# Patient Record
Sex: FEMALE | Race: WHITE | Employment: FULL TIME | URBAN - METROPOLITAN AREA
[De-identification: names, ages, dates, MRNs, and addresses within clinical notes are randomized per-mention and may not be internally consistent; named-entity substitution may affect disease eponyms.]

---

## 2018-11-05 ENCOUNTER — HOSPITAL ENCOUNTER (EMERGENCY)
Age: 23
Discharge: HOME | End: 2018-11-05
Attending: EMERGENCY MEDICINE
Payer: COMMERCIAL

## 2018-11-05 VITALS
HEART RATE: 71 BPM | HEIGHT: 66 IN | OXYGEN SATURATION: 97 % | RESPIRATION RATE: 16 BRPM | DIASTOLIC BLOOD PRESSURE: 85 MMHG | BODY MASS INDEX: 20.73 KG/M2 | SYSTOLIC BLOOD PRESSURE: 125 MMHG | TEMPERATURE: 98 F | WEIGHT: 129 LBS

## 2018-11-05 DIAGNOSIS — G44.209 ACUTE NON INTRACTABLE TENSION-TYPE HEADACHE: Primary | ICD-10-CM

## 2018-11-05 DIAGNOSIS — R42 DIZZINESS: ICD-10-CM

## 2018-11-05 LAB
ALBUMIN SERPL-MCNC: 3.4 G/DL (ref 3.4–5)
ALBUMIN/GLOB SERPL: 1 {RATIO} (ref 0.8–1.7)
ALP SERPL-CCNC: 52 U/L (ref 45–117)
ALT SERPL-CCNC: 17 U/L (ref 13–56)
ANION GAP SERPL CALC-SCNC: 9 MMOL/L (ref 3–18)
APPEARANCE UR: CLEAR
AST SERPL-CCNC: 16 U/L (ref 15–37)
BACTERIA URNS QL MICRO: ABNORMAL /HPF
BASOPHILS # BLD: 0 K/UL (ref 0–0.1)
BASOPHILS NFR BLD: 0 % (ref 0–2)
BILIRUB SERPL-MCNC: 0.3 MG/DL (ref 0.2–1)
BILIRUB UR QL: NEGATIVE
BUN SERPL-MCNC: 12 MG/DL (ref 7–18)
BUN/CREAT SERPL: 17
CALCIUM SERPL-MCNC: 8.3 MG/DL (ref 8.5–10.1)
CHLORIDE SERPL-SCNC: 109 MMOL/L (ref 100–108)
CO2 SERPL-SCNC: 21 MMOL/L (ref 21–32)
COLOR UR: YELLOW
CREAT SERPL-MCNC: 0.7 MG/DL (ref 0.6–1.3)
DIFFERENTIAL METHOD BLD: ABNORMAL
EOSINOPHIL # BLD: 0.7 K/UL (ref 0–0.4)
EOSINOPHIL NFR BLD: 13 % (ref 0–5)
EPITH CASTS URNS QL MICRO: ABNORMAL /LPF (ref 0–5)
ERYTHROCYTE [DISTWIDTH] IN BLOOD BY AUTOMATED COUNT: 15.1 % (ref 11.6–14.5)
GLOBULIN SER CALC-MCNC: 3.3 G/DL (ref 2–4)
GLUCOSE SERPL-MCNC: 89 MG/DL (ref 74–99)
GLUCOSE UR STRIP.AUTO-MCNC: NEGATIVE MG/DL
HCG UR QL: NEGATIVE
HCT VFR BLD AUTO: 38.9 % (ref 35–45)
HGB BLD-MCNC: 13 G/DL (ref 12–16)
HGB UR QL STRIP: NEGATIVE
KETONES UR QL STRIP.AUTO: NEGATIVE MG/DL
LEUKOCYTE ESTERASE UR QL STRIP.AUTO: NEGATIVE
LYMPHOCYTES # BLD: 1.7 K/UL (ref 0.9–3.6)
LYMPHOCYTES NFR BLD: 34 % (ref 21–52)
MAGNESIUM SERPL-MCNC: 1.9 MG/DL (ref 1.6–2.6)
MCH RBC QN AUTO: 29.4 PG (ref 24–34)
MCHC RBC AUTO-ENTMCNC: 33.4 G/DL (ref 31–37)
MCV RBC AUTO: 88 FL (ref 74–97)
MONOCYTES # BLD: 0.3 K/UL (ref 0.05–1.2)
MONOCYTES NFR BLD: 7 % (ref 3–10)
NEUTS SEG # BLD: 2.3 K/UL (ref 1.8–8)
NEUTS SEG NFR BLD: 46 % (ref 40–73)
NITRITE UR QL STRIP.AUTO: NEGATIVE
PH UR STRIP: 5 [PH] (ref 5–8)
PLATELET # BLD AUTO: 238 K/UL (ref 135–420)
PMV BLD AUTO: 10.8 FL (ref 9.2–11.8)
POTASSIUM SERPL-SCNC: 4.1 MMOL/L (ref 3.5–5.5)
PROT SERPL-MCNC: 6.7 G/DL (ref 6.4–8.2)
PROT UR STRIP-MCNC: ABNORMAL MG/DL
RBC # BLD AUTO: 4.42 M/UL (ref 4.2–5.3)
RBC #/AREA URNS HPF: 0 /HPF (ref 0–5)
SODIUM SERPL-SCNC: 139 MMOL/L (ref 136–145)
SP GR UR REFRACTOMETRY: 1.02 (ref 1–1.03)
UROBILINOGEN UR QL STRIP.AUTO: 0.2 EU/DL (ref 0.2–1)
WBC # BLD AUTO: 5 K/UL (ref 4.6–13.2)
WBC URNS QL MICRO: ABNORMAL /HPF (ref 0–5)

## 2018-11-05 PROCEDURE — 99284 EMERGENCY DEPT VISIT MOD MDM: CPT

## 2018-11-05 PROCEDURE — 96375 TX/PRO/DX INJ NEW DRUG ADDON: CPT

## 2018-11-05 PROCEDURE — 81001 URINALYSIS AUTO W/SCOPE: CPT | Performed by: PHYSICIAN ASSISTANT

## 2018-11-05 PROCEDURE — 74011250636 HC RX REV CODE- 250/636: Performed by: PHYSICIAN ASSISTANT

## 2018-11-05 PROCEDURE — 81025 URINE PREGNANCY TEST: CPT

## 2018-11-05 PROCEDURE — 85025 COMPLETE CBC W/AUTO DIFF WBC: CPT | Performed by: PHYSICIAN ASSISTANT

## 2018-11-05 PROCEDURE — 83735 ASSAY OF MAGNESIUM: CPT | Performed by: PHYSICIAN ASSISTANT

## 2018-11-05 PROCEDURE — 96365 THER/PROPH/DIAG IV INF INIT: CPT

## 2018-11-05 PROCEDURE — 80053 COMPREHEN METABOLIC PANEL: CPT | Performed by: PHYSICIAN ASSISTANT

## 2018-11-05 RX ORDER — KETOROLAC TROMETHAMINE 30 MG/ML
30 INJECTION, SOLUTION INTRAMUSCULAR; INTRAVENOUS
Status: COMPLETED | OUTPATIENT
Start: 2018-11-05 | End: 2018-11-05

## 2018-11-05 RX ORDER — ONDANSETRON 4 MG/1
4 TABLET, ORALLY DISINTEGRATING ORAL
Qty: 12 TAB | Refills: 0 | Status: SHIPPED | OUTPATIENT
Start: 2018-11-05 | End: 2020-06-30

## 2018-11-05 RX ORDER — BUTALBITAL, ACETAMINOPHEN AND CAFFEINE 300; 40; 50 MG/1; MG/1; MG/1
1 CAPSULE ORAL
Qty: 10 CAP | Refills: 0 | Status: SHIPPED | OUTPATIENT
Start: 2018-11-05 | End: 2020-06-30

## 2018-11-05 RX ORDER — ACETAMINOPHEN 10 MG/ML
1000 INJECTION, SOLUTION INTRAVENOUS ONCE
Status: COMPLETED | OUTPATIENT
Start: 2018-11-05 | End: 2018-11-05

## 2018-11-05 RX ORDER — ONDANSETRON 2 MG/ML
4 INJECTION INTRAMUSCULAR; INTRAVENOUS
Status: COMPLETED | OUTPATIENT
Start: 2018-11-05 | End: 2018-11-05

## 2018-11-05 RX ORDER — DEXAMETHASONE SODIUM PHOSPHATE 4 MG/ML
10 INJECTION, SOLUTION INTRA-ARTICULAR; INTRALESIONAL; INTRAMUSCULAR; INTRAVENOUS; SOFT TISSUE
Status: COMPLETED | OUTPATIENT
Start: 2018-11-05 | End: 2018-11-05

## 2018-11-05 RX ADMIN — SODIUM CHLORIDE 1000 ML: 900 INJECTION, SOLUTION INTRAVENOUS at 10:46

## 2018-11-05 RX ADMIN — KETOROLAC TROMETHAMINE 30 MG: 30 INJECTION, SOLUTION INTRAMUSCULAR at 10:49

## 2018-11-05 RX ADMIN — DEXAMETHASONE SODIUM PHOSPHATE 10 MG: 4 INJECTION, SOLUTION INTRAMUSCULAR; INTRAVENOUS at 10:51

## 2018-11-05 RX ADMIN — ONDANSETRON 4 MG: 2 INJECTION INTRAMUSCULAR; INTRAVENOUS at 10:47

## 2018-11-05 RX ADMIN — ACETAMINOPHEN 1000 MG: 10 INJECTION, SOLUTION INTRAVENOUS at 10:54

## 2018-11-05 NOTE — ED TRIAGE NOTES
Pt reports severe headache since yesterday, has had headaches in past, this one worst because it starts at her neck and radiates up both sides of the back of her head;  Some nausea; Feels like she's \"zoning out\"  Pt reports she has seen her doctor for same and they said she had a virus; Pt also reports breaking out in rash around her mouth

## 2018-11-05 NOTE — DISCHARGE INSTRUCTIONS
Tension Headache: Care Instructions  Your Care Instructions  Most headaches are tension headaches. These headaches tend to happen again, especially if you are under stress. A tension headache may cause pain or a feeling of pressure all over your head. You probably can't pinpoint the center of the pain. If you keep getting tension headaches, the best thing you can do to limit them is to find out what is causing them and then make changes in those areas. Follow-up care is a key part of your treatment and safety. Be sure to make and go to all appointments, and call your doctor if you are having problems. It's also a good idea to know your test results and keep a list of the medicines you take. How can you care for yourself at home? · Rest in a quiet, dark room with a cool cloth on your forehead until your headache is gone. Close your eyes, and try to relax or go to sleep. Don't watch TV or read. Avoid using the computer. · Use a warm, moist towel or a heating pad set on low to relax tight shoulder and neck muscles. · Have someone gently massage your neck and shoulders. · Take pain medicines exactly as directed. ? If the doctor gave you a prescription medicine for pain, take it as prescribed. ? If you are not taking a prescription pain medicine, ask your doctor if you can take an over-the-counter medicine. · Be careful not to take pain medicine more often than the instructions allow, because you may get worse or more frequent headaches when the medicine wears off. · If you get another tension headache, stop what you are doing and sit quietly for a moment. Close your eyes and breathe slowly. Try to relax your head and neck muscles. · Do not ignore new symptoms that occur with a headache, such as fever, weakness or numbness, vision changes, or confusion. These may be signs of a more serious problem. To help prevent headaches  · Keep a headache diary so you can figure out what triggers your headaches. Avoiding triggers may help you prevent headaches. Record when each headache began, how long it lasted, and what the pain was like (throbbing, aching, stabbing, or dull). List anything that may have triggered the headache, such as being physically or emotionally stressed or being anxious or depressed. Other possible triggers are hunger, anger, fatigue, poor posture, and muscle strain. · Find healthy ways to deal with stress. Headaches are most common during or right after stressful times. Take time to relax before and after you do something that has caused a headache in the past.  · Exercise daily to relieve stress. Relaxation exercises may help reduce tension. · Get plenty of sleep. · Eat regularly and well. Long periods without food can trigger a headache. · Treat yourself to a massage. Some people find that massages are very helpful in relieving tension. · Try to keep your muscles relaxed by keeping good posture. Check your jaw, face, neck, and shoulder muscles for tension, and try to relax them. When sitting at a desk, change positions often, and stretch for 30 seconds each hour. · Reduce eyestrain from computers by blinking frequently and looking away from the computer screen every so often. Make sure you have proper eyewear and that your monitor is set up properly, about an arm's length away. When should you call for help? Call 911 anytime you think you may need emergency care. For example, call if:    · You have signs of a stroke. These may include:  ? Sudden numbness, paralysis, or weakness in your face, arm, or leg, especially on only one side of your body. ? Sudden vision changes. ? Sudden trouble speaking. ? Sudden confusion or trouble understanding simple statements. ? Sudden problems with walking or balance.   ? A sudden, severe headache that is different from past headaches.    Call your doctor now or seek immediate medical care if:    · You have new or worse nausea and vomiting.     · You have a new or higher fever.     · Your headache gets much worse.    Watch closely for changes in your health, and be sure to contact your doctor if:    · You are not getting better after 2 days (48 hours). Where can you learn more? Go to http://shelly-ben.info/. Enter 84 17 28 in the search box to learn more about \"Tension Headache: Care Instructions. \"  Current as of: June 4, 2018  Content Version: 11.8  © 6643-8647 [employer]. Care instructions adapted under license by [employer] (which disclaims liability or warranty for this information). If you have questions about a medical condition or this instruction, always ask your healthcare professional. [address] any warranty or liability for your use of this information.

## 2018-11-05 NOTE — LETTER
[facility] [place] 
[facility] 
[place] [id number] 
[phone] School Note Date: 11/5/2018 To Whom It May concern: 
 
Chente Andrade was seen and treated today in the emergency room by the following provider(s): 
Attending Provider: Domitila Sims MD 
Physician Assistant: Gilmer Sanchez PA-C. Chente Andrade may return to school on 11/6/18. Sincerely, Eric Conway PA-C

## 2018-11-05 NOTE — ED PROVIDER NOTES
EMERGENCY DEPARTMENT HISTORY AND PHYSICAL EXAM 
 
Date: 11/5/2018 Patient Name: More Garcia History of Presenting Illness Chief Complaint Patient presents with  
 Headache History Provided By: Patient Chief Complaint: HA 
Duration: 2-3 Weeks Timing:  Acute Location: Radiating from neck up head Quality: Aching Severity: 8 out of 10 Modifying Factors: Pain is exacerbated with movement, sound and when taking. Some alleviation wth 3 Excedrin. Associated Symptoms: neck pain described as discomfort, light-headedness, nausea, photophobia, fatigue, rash surrounding her mouth, subjective fever and intermittent sore throat Additional History (Context):  
8:52 AM  
More Garcia is a 26 y.o. female with PMHx of celiac disease and eczema who presents to the emergency department C/O aching HA (8/10) radiating from her neck up her head onset 2-3 weeks. Associated sxs include neck pain described as discomfort, light-headedness, nausea, photophobia, fatigue, rash surrounding her mouth, subjective fever and intermittent sore throat. Pain is exacerbated with movement, sound and when taking. Some alleviation with 3 Excedrin. Pt reports Hx of headaches. Pt reports she has seen her PCP for same and was told she had a virus. Pt states her mouth rash was dx'ed by her PCP as HSV but was told by a dermatologist it was not HSV due to the presentation. Pt admits to working two jobs while attending school but states she does sleep well. Pt is currently on her menses. Pt states she works in a [employer] and is concerned about her celiac disease. NKDA. Pt denies vomiting, chills, and any other sxs or complaints. PCP: Destiney Gaspar MD 
 
Current Outpatient Medications Medication Sig Dispense Refill  butalbital-acetaminophen-caff (FIORICET) -40 mg per capsule Take 1 Cap by mouth every four (4) hours as needed for Pain.  10 Cap 0  
  ondansetron (ZOFRAN ODT) 4 mg disintegrating tablet Take 1 Tab by mouth every eight (8) hours as needed for Nausea (or vomiting.). Allow to dissolve on tongue 12 Tab 0  
 norgestimate-ethinyl estradiol (MONO-LINYAH) 0.25-35 mg-mcg per tablet Take 1 Tab by mouth daily. Past History Past Medical History: 
Past Medical History:  
Diagnosis Date  Celiac disease Past Surgical History: 
Past Surgical History:  
Procedure Laterality Date  HX WISDOM TEETH EXTRACTION Family History: 
History reviewed. No pertinent family history. Social History: 
Social History Tobacco Use  Smoking status: Current Every Day Smoker Substance Use Topics  Alcohol use: Yes  Drug use: Not on file Allergies: Allergies Allergen Reactions  Gluten Other (comments) GI distress Review of Systems Review of Systems Constitutional: Positive for fatigue and fever (subjective). Negative for chills. HENT: Positive for sore throat (intermittent). Negative for congestion. Eyes: Positive for photophobia. Respiratory: Negative for cough and shortness of breath. Cardiovascular: Negative for chest pain. Gastrointestinal: Positive for nausea. Negative for abdominal distention and vomiting. Genitourinary: Positive for vaginal discharge. Negative for dysuria, frequency and urgency. Vaginal bleeding: on menses. Musculoskeletal: Positive for neck pain. Negative for back pain. Skin: Positive for rash (surrounding the mouth). Neurological: Positive for light-headedness and headaches. Negative for dizziness. Psychiatric/Behavioral:  
     (+) \"Zoning out\" All other systems reviewed and are negative. Physical Exam  
 
Vitals:  
 11/05/18 0803 11/05/18 1057 BP: 126/72 125/85 Pulse: 88 71 Resp: 16 16 Temp: 98 °F (36.7 °C) SpO2: 100% 97% Weight: 58.5 kg (129 lb) Height: 5' 6\" (1.676 m) Physical Exam  
 Constitutional: She is oriented to person, place, and time. She appears well-developed and well-nourished. No distress.  female in NAD. Alert. Social smile. Appears comfortable. Mother at bedside. HENT:  
Head: Normocephalic and atraumatic. Right Ear: External ear normal. No swelling or tenderness. Tympanic membrane is not perforated, not erythematous and not bulging. No hemotympanum. Left Ear: External ear normal. No swelling or tenderness. Tympanic membrane is not perforated, not erythematous and not bulging. No hemotympanum. Nose: Nose normal. No mucosal edema or rhinorrhea. Right sinus exhibits no maxillary sinus tenderness and no frontal sinus tenderness. Left sinus exhibits no maxillary sinus tenderness and no frontal sinus tenderness. Mouth/Throat: Uvula is midline, oropharynx is clear and moist and mucous membranes are normal. No oral lesions. No trismus in the jaw. No dental abscesses or uvula swelling. No oropharyngeal exudate, posterior oropharyngeal edema, posterior oropharyngeal erythema or tonsillar abscesses. Eyes: Conjunctivae and EOM are normal. Pupils are equal, round, and reactive to light. Right eye exhibits no discharge. Left eye exhibits no discharge. No scleral icterus. Neck: Normal range of motion. Neck supple. Cardiovascular: Normal rate, regular rhythm, normal heart sounds and intact distal pulses. Exam reveals no gallop and no friction rub. No murmur heard. Pulmonary/Chest: Effort normal and breath sounds normal. No accessory muscle usage. No tachypnea. No respiratory distress. She has no decreased breath sounds. She has no wheezes. She has no rhonchi. She has no rales. Musculoskeletal: Normal range of motion. She exhibits no edema or tenderness. Lymphadenopathy:  
  She has no cervical adenopathy. Neurological: She is alert and oriented to person, place, and time. She has normal strength. No cranial nerve deficit or sensory deficit. Coordination and gait normal. GCS eye subscore is 4. GCS verbal subscore is 5. GCS motor subscore is 6. Normal finger to nose Neg pronator Skin: Skin is warm and dry. No rash noted. She is not diaphoretic. No erythema. Psychiatric: She has a normal mood and affect. Judgment normal.  
Nursing note and vitals reviewed. Diagnostic Study Results Labs - Recent Results (from the past 12 hour(s)) CBC WITH AUTOMATED DIFF Collection Time: 11/05/18 10:15 AM  
Result Value Ref Range WBC 5.0 4.6 - 13.2 K/uL  
 RBC 4.42 4.20 - 5.30 M/uL  
 HGB 13.0 12.0 - 16.0 g/dL HCT 38.9 35.0 - 45.0 % MCV 88.0 74.0 - 97.0 FL  
 MCH 29.4 24.0 - 34.0 PG  
 MCHC 33.4 31.0 - 37.0 g/dL  
 RDW 15.1 (H) 11.6 - 14.5 % PLATELET 100 326 - 708 K/uL MPV 10.8 9.2 - 11.8 FL  
 NEUTROPHILS 46 40 - 73 % LYMPHOCYTES 34 21 - 52 % MONOCYTES 7 3 - 10 % EOSINOPHILS 13 (H) 0 - 5 % BASOPHILS 0 0 - 2 %  
 ABS. NEUTROPHILS 2.3 1.8 - 8.0 K/UL  
 ABS. LYMPHOCYTES 1.7 0.9 - 3.6 K/UL  
 ABS. MONOCYTES 0.3 0.05 - 1.2 K/UL  
 ABS. EOSINOPHILS 0.7 (H) 0.0 - 0.4 K/UL  
 ABS. BASOPHILS 0.0 0.0 - 0.1 K/UL  
 DF AUTOMATED METABOLIC PANEL, COMPREHENSIVE Collection Time: 11/05/18 10:15 AM  
Result Value Ref Range Sodium 139 136 - 145 mmol/L Potassium 4.1 3.5 - 5.5 mmol/L Chloride 109 (H) 100 - 108 mmol/L  
 CO2 21 21 - 32 mmol/L Anion gap 9 3.0 - 18 mmol/L Glucose 89 74 - 99 mg/dL BUN 12 7.0 - 18 MG/DL Creatinine 0.70 0.6 - 1.3 MG/DL  
 BUN/Creatinine ratio 17 GFR est AA >60 >60 ml/min/1.73m2 GFR est non-AA >60 >60 ml/min/1.73m2 Calcium 8.3 (L) 8.5 - 10.1 MG/DL Bilirubin, total 0.3 0.2 - 1.0 MG/DL  
 ALT (SGPT) 17 13 - 56 U/L  
 AST (SGOT) 16 15 - 37 U/L Alk. phosphatase 52 45 - 117 U/L Protein, total 6.7 6.4 - 8.2 g/dL Albumin 3.4 3.4 - 5.0 g/dL Globulin 3.3 2.0 - 4.0 g/dL A-G Ratio 1.0 0.8 - 1.7 MAGNESIUM  Collection Time: 11/05/18 10:15 AM  
 Result Value Ref Range Magnesium 1.9 1.6 - 2.6 mg/dL URINALYSIS W/ RFLX MICROSCOPIC Collection Time: 11/05/18 10:30 AM  
Result Value Ref Range Color YELLOW Appearance CLEAR Specific gravity 1.022 1.005 - 1.030    
 pH (UA) 5.0 5.0 - 8.0 Protein TRACE (A) NEG mg/dL Glucose NEGATIVE  NEG mg/dL Ketone NEGATIVE  NEG mg/dL Bilirubin NEGATIVE  NEG Blood NEGATIVE  NEG Urobilinogen 0.2 0.2 - 1.0 EU/dL Nitrites NEGATIVE  NEG Leukocyte Esterase NEGATIVE  NEG    
HCG URINE, QL. - POC Collection Time: 11/05/18 10:30 AM  
Result Value Ref Range Pregnancy test,urine (POC) NEGATIVE  NEG    
URINE MICROSCOPIC ONLY Collection Time: 11/05/18 10:30 AM  
Result Value Ref Range WBC 0 to 2 0 - 5 /hpf  
 RBC 0 0 - 5 /hpf Epithelial cells 1+ 0 - 5 /lpf Bacteria FEW (A) NEG /hpf Radiologic Studies - No orders to display CT Results  (Last 48 hours) None CXR Results  (Last 48 hours) None Medications given in the ED- Medications  
dexamethasone (DECADRON) 4 mg/mL injection 10 mg (10 mg IntraVENous Given 11/5/18 1051)  
ketorolac (TORADOL) injection 30 mg (30 mg IntraVENous Given 11/5/18 1049) ondansetron [facility]) injection 4 mg (4 mg IntraVENous Given 11/5/18 1047)  
sodium chloride 0.9 % bolus infusion 1,000 mL (1,000 mL IntraVENous New Bag 11/5/18 1046)  
acetaminophen (OFIRMEV) infusion 1,000 mg (0 mg IntraVENous IV Completed 11/5/18 1128) Medical Decision Making I am the first provider for this patient. I reviewed the vital signs, available nursing notes, past medical history, past surgical history, family history and social history. Vital Signs-Reviewed the patient's vital signs. Pulse Oximetry Analysis - 100% on RA Records Reviewed: Nursing Notes and Old Medical Records Provider Notes (Medical Decision Making): tension, cluster, sinusitis, migraine, pseudotumor, TA, dehydration, metabolic derangement, new onse DMII. Not c/w SAH. Doubt meningitis Procedures: 
Procedures ED Course:  
8:52 AM Initial assessment performed. The patients presenting problems have been discussed, and they are in agreement with the care plan formulated and outlined with them. I have encouraged them to ask questions as they arise throughout their visit. 11:47 AM Pt feels better. Headache is down to a 2/10. Nausea has resolved. Neck supple. No indication for emergent CT or LP. Close PCP FU. Recommended outpatient lyme titer. Suspect HA tension related. No FND on serial exams. Reasons to RTED discussed with pt. All questions answered. Pt feels comfortable going home at this time. Pt expressed understanding and she agrees with plan. Diagnosis and Disposition DISCHARGE NOTE: 
11:48 AM  
Will Malin's  results have been reviewed with her. She has been counseled regarding her diagnosis, treatment, and plan. She verbally conveys understanding and agreement of the signs, symptoms, diagnosis, treatment and prognosis and additionally agrees to follow up as discussed. She also agrees with the care-plan and conveys that all of her questions have been answered. I have also provided discharge instructions for her that include: educational information regarding their diagnosis and treatment, and list of reasons why they would want to return to the ED prior to their follow-up appointment, should her condition change. She has been provided with education for proper emergency department utilization. CLINICAL IMPRESSION: 
 
1. Acute non intractable tension-type headache 2. Dizziness PLAN: 
1. D/C Home 2. Current Discharge Medication List  
  
START taking these medications Details  
butalbital-acetaminophen-caff (FIORICET) -40 mg per capsule Take 1 Cap by mouth every four (4) hours as needed for Pain. Qty: 10 Cap, Refills: 0 Associated Diagnoses: Acute non intractable tension-type headache  
  
ondansetron (ZOFRAN ODT) 4 mg disintegrating tablet Take 1 Tab by mouth every eight (8) hours as needed for Nausea (or vomiting.). Allow to dissolve on tongue Qty: 12 Tab, Refills: 0  
  
  
 
3. Follow-up Information Follow up With Specialties Details Why Contact Info Rosanna Allen MD Family Practice Schedule an appointment as soon as possible for a visit in 3 days For primary care follow up. [address] [facility] [employer] [address] [place]aredes [id number] [phone] [facility] EMERGENCY DEPT Emergency Medicine Go to As needed, If symptoms worsen [address] [facility] [address] [place] [id number] 
[phone]  
  
 
_______________________________ Attestations: This note is prepared by Stacy Swanson, acting as Scribe for [employer]. [employer]:  The scribe's documentation has been prepared under my direction and personally reviewed by me in its entirety. I confirm that the note above accurately reflects all work, treatment, procedures, and medical decision making performed by me. 
_______________________________

## 2019-07-12 ENCOUNTER — HOSPITAL ENCOUNTER (EMERGENCY)
Age: 24
Discharge: HOME | End: 2019-07-13
Attending: EMERGENCY MEDICINE | Admitting: EMERGENCY MEDICINE
Payer: COMMERCIAL

## 2019-07-12 DIAGNOSIS — B37.31 YEAST VAGINITIS: ICD-10-CM

## 2019-07-12 DIAGNOSIS — O20.0 THREATENED MISCARRIAGE IN EARLY PREGNANCY: Primary | ICD-10-CM

## 2019-07-12 DIAGNOSIS — N76.0 BV (BACTERIAL VAGINOSIS): ICD-10-CM

## 2019-07-12 DIAGNOSIS — B96.89 BV (BACTERIAL VAGINOSIS): ICD-10-CM

## 2019-07-12 LAB
ALBUMIN SERPL-MCNC: 4.3 G/DL (ref 3.4–5)
ALBUMIN/GLOB SERPL: 1.2 {RATIO} (ref 0.8–1.7)
ALP SERPL-CCNC: 65 U/L (ref 45–117)
ALT SERPL-CCNC: 25 U/L (ref 13–56)
ANION GAP SERPL CALC-SCNC: 8 MMOL/L (ref 3–18)
APPEARANCE UR: CLEAR
AST SERPL-CCNC: 16 U/L (ref 15–37)
BASOPHILS # BLD: 0 K/UL (ref 0–0.1)
BASOPHILS NFR BLD: 0 % (ref 0–2)
BILIRUB SERPL-MCNC: 0.3 MG/DL (ref 0.2–1)
BILIRUB UR QL: NEGATIVE
BUN SERPL-MCNC: 16 MG/DL (ref 7–18)
BUN/CREAT SERPL: 20 (ref 12–20)
CALCIUM SERPL-MCNC: 9.2 MG/DL (ref 8.5–10.1)
CHLORIDE SERPL-SCNC: 106 MMOL/L (ref 100–108)
CO2 SERPL-SCNC: 26 MMOL/L (ref 21–32)
COLOR UR: YELLOW
CREAT SERPL-MCNC: 0.82 MG/DL (ref 0.6–1.3)
DIFFERENTIAL METHOD BLD: NORMAL
EOSINOPHIL # BLD: 0.2 K/UL (ref 0–0.4)
EOSINOPHIL NFR BLD: 3 % (ref 0–5)
ERYTHROCYTE [DISTWIDTH] IN BLOOD BY AUTOMATED COUNT: 12.6 % (ref 11.6–14.5)
GLOBULIN SER CALC-MCNC: 3.6 G/DL (ref 2–4)
GLUCOSE SERPL-MCNC: 87 MG/DL (ref 74–99)
GLUCOSE UR STRIP.AUTO-MCNC: NEGATIVE MG/DL
HCG SERPL-ACNC: 920 MIU/ML (ref 0–10)
HCG UR QL: NEGATIVE
HCT VFR BLD AUTO: 42.3 % (ref 35–45)
HGB BLD-MCNC: 14.4 G/DL (ref 12–16)
HGB UR QL STRIP: NEGATIVE
KETONES UR QL STRIP.AUTO: NEGATIVE MG/DL
LEUKOCYTE ESTERASE UR QL STRIP.AUTO: NEGATIVE
LYMPHOCYTES # BLD: 2.8 K/UL (ref 0.9–3.6)
LYMPHOCYTES NFR BLD: 37 % (ref 21–52)
MCH RBC QN AUTO: 31.4 PG (ref 24–34)
MCHC RBC AUTO-ENTMCNC: 34 G/DL (ref 31–37)
MCV RBC AUTO: 92.4 FL (ref 74–97)
MONOCYTES # BLD: 0.6 K/UL (ref 0.05–1.2)
MONOCYTES NFR BLD: 8 % (ref 3–10)
NEUTS SEG # BLD: 4 K/UL (ref 1.8–8)
NEUTS SEG NFR BLD: 52 % (ref 40–73)
NITRITE UR QL STRIP.AUTO: NEGATIVE
PH UR STRIP: 5.5 [PH] (ref 5–8)
PLATELET # BLD AUTO: 295 K/UL (ref 135–420)
PMV BLD AUTO: 10.4 FL (ref 9.2–11.8)
POTASSIUM SERPL-SCNC: 3.4 MMOL/L (ref 3.5–5.5)
PROT SERPL-MCNC: 7.9 G/DL (ref 6.4–8.2)
PROT UR STRIP-MCNC: NEGATIVE MG/DL
RBC # BLD AUTO: 4.58 M/UL (ref 4.2–5.3)
SODIUM SERPL-SCNC: 140 MMOL/L (ref 136–145)
SP GR UR REFRACTOMETRY: 1.01 (ref 1–1.03)
UROBILINOGEN UR QL STRIP.AUTO: 0.2 EU/DL (ref 0.2–1)
WBC # BLD AUTO: 7.7 K/UL (ref 4.6–13.2)

## 2019-07-12 PROCEDURE — 87210 SMEAR WET MOUNT SALINE/INK: CPT

## 2019-07-12 PROCEDURE — 87491 CHLMYD TRACH DNA AMP PROBE: CPT

## 2019-07-12 PROCEDURE — 80053 COMPREHEN METABOLIC PANEL: CPT

## 2019-07-12 PROCEDURE — 85025 COMPLETE CBC W/AUTO DIFF WBC: CPT

## 2019-07-12 PROCEDURE — 81025 URINE PREGNANCY TEST: CPT

## 2019-07-12 PROCEDURE — 99284 EMERGENCY DEPT VISIT MOD MDM: CPT

## 2019-07-12 PROCEDURE — 84702 CHORIONIC GONADOTROPIN TEST: CPT

## 2019-07-12 PROCEDURE — 81003 URINALYSIS AUTO W/O SCOPE: CPT

## 2019-07-13 VITALS
TEMPERATURE: 98.4 F | OXYGEN SATURATION: 100 % | BODY MASS INDEX: 21.66 KG/M2 | DIASTOLIC BLOOD PRESSURE: 72 MMHG | WEIGHT: 130 LBS | RESPIRATION RATE: 18 BRPM | SYSTOLIC BLOOD PRESSURE: 107 MMHG | HEIGHT: 65 IN | HEART RATE: 88 BPM

## 2019-07-13 LAB
ABO + RH BLD: NORMAL
BLOOD GROUP ANTIBODIES SERPL: NORMAL
SERVICE CMNT-IMP: NORMAL
SPECIMEN EXP DATE BLD: NORMAL
WET PREP GENITAL: NORMAL

## 2019-07-13 PROCEDURE — 86900 BLOOD TYPING SEROLOGIC ABO: CPT

## 2019-07-13 RX ORDER — NYSTATIN 100000 U/G
CREAM TOPICAL 2 TIMES DAILY
Qty: 15 G | Refills: 0 | Status: SHIPPED | OUTPATIENT
Start: 2019-07-13 | End: 2020-06-30

## 2019-07-13 RX ORDER — METRONIDAZOLE 500 MG/1
500 TABLET ORAL 2 TIMES DAILY
Qty: 14 TAB | Refills: 0 | Status: SHIPPED | OUTPATIENT
Start: 2019-07-13 | End: 2019-07-20

## 2019-07-13 NOTE — ED PROVIDER NOTES
EMERGENCY DEPARTMENT HISTORY AND PHYSICAL EXAM    Date: 7/12/2019  Patient Name: Argenis Whitley    History of Presenting Illness     Chief Complaint   Patient presents with    Vaginal Bleeding         History Provided By: Patient    Argenis Whitley is a 22 y.o. female with PMHX of celiac's disease who presents to the emergency department C/O pelvic cramping. Associated sxs include vaginal bleeding. LMP 5/21/2019. She reports positive UPT at home approximately 10 days ago. Patient states today started with some pelvic cramping and vaginal bleeding. Patient has upcoming OB appointment scheduled with Dr. Fatmata Martel however has not been evaluated yet. This is patient's first pregnancy. Pt denies nausea vomiting diarrhea fever, and any other sxs or complaints. PCP: Ludivina Miranda MD    Current Outpatient Medications   Medication Sig Dispense Refill    metroNIDAZOLE (FLAGYL) 500 mg tablet Take 1 Tab by mouth two (2) times a day for 7 days. 14 Tab 0    nystatin (MYCOSTATIN) topical cream Apply  to affected area two (2) times a day. 15 g 0    butalbital-acetaminophen-caff (FIORICET) -40 mg per capsule Take 1 Cap by mouth every four (4) hours as needed for Pain. 10 Cap 0    ondansetron (ZOFRAN ODT) 4 mg disintegrating tablet Take 1 Tab by mouth every eight (8) hours as needed for Nausea (or vomiting.). Allow to dissolve on tongue 12 Tab 0    norgestimate-ethinyl estradiol (MONO-LINYAH) 0.25-35 mg-mcg per tablet Take 1 Tab by mouth daily. Past History     Past Medical History:  Past Medical History:   Diagnosis Date    Celiac disease        Past Surgical History:  Past Surgical History:   Procedure Laterality Date    HX WISDOM TEETH EXTRACTION         Family History:  History reviewed. No pertinent family history. Social History:  Social History     Tobacco Use    Smoking status: Never Smoker    Smokeless tobacco: Never Used   Substance Use Topics    Alcohol use:  Yes    Drug use: Never       Allergies: Allergies   Allergen Reactions    Gluten Other (comments)     GI distress         Review of Systems   Review of Systems   Constitutional: Negative for fever. Gastrointestinal: Negative for diarrhea, nausea and vomiting. Genitourinary: Positive for pelvic pain and vaginal bleeding. Negative for dysuria. All other systems reviewed and are negative. Physical Exam     Vitals:    07/12/19 2244   BP: 122/80   Pulse: 88   Resp: 18   Temp: 98.4 °F (36.9 °C)   SpO2: 100%   Weight: 59 kg (130 lb)   Height: 5' 5\" (1.651 m)     Physical Exam   Constitutional: She is oriented to person, place, and time. She appears well-developed and well-nourished. No distress. HENT:   Head: Normocephalic and atraumatic. Eyes: Pupils are equal, round, and reactive to light. Conjunctivae and EOM are normal.   Neck: Normal range of motion. Neck supple. Cardiovascular: Normal rate and regular rhythm. Pulmonary/Chest: Effort normal and breath sounds normal.   Abdominal: Soft. Bowel sounds are normal. She exhibits no distension. There is no tenderness. There is no rebound and no guarding. Genitourinary: Uterus is not tender. Cervix exhibits no motion tenderness. There is bleeding (Evidence of previous bleeding with old blood in vault no active bleeding noted) in the vagina. No tenderness in the vagina. Musculoskeletal: Normal range of motion. Neurological: She is alert and oriented to person, place, and time. Skin: Skin is warm and dry. Psychiatric: She has a normal mood and affect. Her behavior is normal.   Nursing note and vitals reviewed.       Diagnostic Study Results     Labs -     Recent Results (from the past 12 hour(s))   URINALYSIS W/ RFLX MICROSCOPIC    Collection Time: 07/12/19 10:40 PM   Result Value Ref Range    Color YELLOW      Appearance CLEAR      Specific gravity 1.008 1.005 - 1.030      pH (UA) 5.5 5.0 - 8.0      Protein NEGATIVE  NEG mg/dL    Glucose NEGATIVE  NEG mg/dL Ketone NEGATIVE  NEG mg/dL    Bilirubin NEGATIVE  NEG      Blood NEGATIVE  NEG      Urobilinogen 0.2 0.2 - 1.0 EU/dL    Nitrites NEGATIVE  NEG      Leukocyte Esterase NEGATIVE  NEG     HCG URINE, QL. - POC    Collection Time: 07/12/19 10:54 PM   Result Value Ref Range    Pregnancy test,urine (POC) NEGATIVE  NEG     CBC WITH AUTOMATED DIFF    Collection Time: 07/12/19 11:01 PM   Result Value Ref Range    WBC 7.7 4.6 - 13.2 K/uL    RBC 4.58 4.20 - 5.30 M/uL    HGB 14.4 12.0 - 16.0 g/dL    HCT 42.3 35.0 - 45.0 %    MCV 92.4 74.0 - 97.0 FL    MCH 31.4 24.0 - 34.0 PG    MCHC 34.0 31.0 - 37.0 g/dL    RDW 12.6 11.6 - 14.5 %    PLATELET 001 634 - 533 K/uL    MPV 10.4 9.2 - 11.8 FL    NEUTROPHILS 52 40 - 73 %    LYMPHOCYTES 37 21 - 52 %    MONOCYTES 8 3 - 10 %    EOSINOPHILS 3 0 - 5 %    BASOPHILS 0 0 - 2 %    ABS. NEUTROPHILS 4.0 1.8 - 8.0 K/UL    ABS. LYMPHOCYTES 2.8 0.9 - 3.6 K/UL    ABS. MONOCYTES 0.6 0.05 - 1.2 K/UL    ABS. EOSINOPHILS 0.2 0.0 - 0.4 K/UL    ABS. BASOPHILS 0.0 0.0 - 0.1 K/UL    DF AUTOMATED     METABOLIC PANEL, COMPREHENSIVE    Collection Time: 07/12/19 11:01 PM   Result Value Ref Range    Sodium 140 136 - 145 mmol/L    Potassium 3.4 (L) 3.5 - 5.5 mmol/L    Chloride 106 100 - 108 mmol/L    CO2 26 21 - 32 mmol/L    Anion gap 8 3.0 - 18 mmol/L    Glucose 87 74 - 99 mg/dL    BUN 16 7.0 - 18 MG/DL    Creatinine 0.82 0.6 - 1.3 MG/DL    BUN/Creatinine ratio 20 12 - 20      GFR est AA >60 >60 ml/min/1.73m2    GFR est non-AA >60 >60 ml/min/1.73m2    Calcium 9.2 8.5 - 10.1 MG/DL    Bilirubin, total 0.3 0.2 - 1.0 MG/DL    ALT (SGPT) 25 13 - 56 U/L    AST (SGOT) 16 15 - 37 U/L    Alk.  phosphatase 65 45 - 117 U/L    Protein, total 7.9 6.4 - 8.2 g/dL    Albumin 4.3 3.4 - 5.0 g/dL    Globulin 3.6 2.0 - 4.0 g/dL    A-G Ratio 1.2 0.8 - 1.7     BETA HCG, QT    Collection Time: 07/12/19 11:01 PM   Result Value Ref Range    Beta HCG,  (H) 0 - 10 MIU/ML   WET PREP    Collection Time: 07/12/19 11:59 PM   Result Value Ref Range    Special Requests: NO SPECIAL REQUESTS      Wet prep CLUE CELLS PRESENT      Wet prep YEAST  FEW        Wet prep NO TRICHOMONAS SEEN         Radiologic Studies -   No orders to display     CT Results  (Last 48 hours)    None        CXR Results  (Last 48 hours)    None          Medications given in the ED-  Medications - No data to display      Medical Decision Making   I am the first provider for this patient. I reviewed the vital signs, available nursing notes, past medical history, past surgical history, family history and social history. Vital Signs-Reviewed the patient's vital signs. Records Reviewed: Nursing Notes    Procedures:  Pelvic Exam  Date/Time: 7/13/2019 12:06 AM  Performed by: PA  Type of exam performed: bimanual and speculum. External genitalia appearance: normal.    Vaginal exam:  bleeding. Bleeding: mild  Cervical exam:  no cervical motion tenderness and os closed. Specimen(s) collected:  chlamydia and GC. Bimanual exam:  normal.    Patient tolerance: Patient tolerated the procedure well with no immediate complications          ED Course:   11:15 PM   Initial assessment performed. The patients presenting problems have been discussed, and they are in agreement with the care plan formulated and outlined with them. I have encouraged them to ask questions as they arise throughout their visit. Discussion: 22 y.o. female primigravida with positive UPT 10 days ago complaining of acute onset tonight pelvic pain and vaginal bleeding. Patient afebrile with appropriate vital signs no active bleeding on vaginal exam quant 990. BV and yeast noted on wet prep GC chlamydia cultures pending. Will plan for Flagyl, nystatin topical and return to this facility in 2 days for repeat quant. Strict return precautions discussed. Diagnosis and Disposition       DISCHARGE NOTE:  Felipe Malin's  results have been reviewed with her.   She has been counseled regarding her diagnosis, treatment, and plan. She verbally conveys understanding and agreement of the signs, symptoms, diagnosis, treatment and prognosis and additionally agrees to follow up as discussed. She also agrees with the care-plan and conveys that all of her questions have been answered. I have also provided discharge instructions for her that include: educational information regarding their diagnosis and treatment, and list of reasons why they would want to return to the ED prior to their follow-up appointment, should her condition change. She has been provided with education for proper emergency department utilization. CLINICAL IMPRESSION:    1. Threatened miscarriage in early pregnancy    2. BV (bacterial vaginosis)    3. Yeast vaginitis        PLAN:  1. D/C Home  2. Current Discharge Medication List      START taking these medications    Details   metroNIDAZOLE (FLAGYL) 500 mg tablet Take 1 Tab by mouth two (2) times a day for 7 days. Qty: 14 Tab, Refills: 0      nystatin (MYCOSTATIN) topical cream Apply  to affected area two (2) times a day. Qty: 15 g, Refills: 0           3. Follow-up Information     Follow up With Specialties Details Why [address]    [facility] EMERGENCY DEPT Emergency Medicine In 2 days for repeat for serum HCG [address] Tomasz Garcia  [phone]    Celestina Castillo MD Obstetrics & Gynecology Call  [address] [place] [phone]                Please note that this dictation was completed with Sku[employer], the computer voice recognition software. Quite often unanticipated grammatical, syntax, homophones, and other interpretive errors are inadvertently transcribed by the computer software. Please disregard these errors. Please excuse any errors that have escaped final proofreading.

## 2019-07-13 NOTE — ED TRIAGE NOTES
Patient reports ambulatory to ED with c/c of slight vaginal bleeding that began tonight with some sharp cramping associated with it. Patient recently found out she was pregnant and may be approx 6-7 weeks along at this time. Patient reports this is her first pregnancy.

## 2019-07-13 NOTE — DISCHARGE INSTRUCTIONS
Patient Education        Bacterial Vaginosis: Care Instructions  Your Care Instructions    Bacterial vaginosis is a type of vaginal infection. It is caused by excess growth of certain bacteria that are normally found in the vagina. Symptoms can include itching, swelling, pain when you urinate or have sex, and a gray or yellow discharge with a \"fishy\" odor. It is not considered an infection that is spread through sexual contact. Although symptoms can be annoying and uncomfortable, bacterial vaginosis does not usually cause other health problems. However, if you have it while you are pregnant, it can cause complications. While the infection may go away on its own, most doctors use antibiotics to treat it. You may have been prescribed pills or vaginal cream. With treatment, bacterial vaginosis usually clears up in 5 to 7 days. Follow-up care is a key part of your treatment and safety. Be sure to make and go to all appointments, and call your doctor if you are having problems. It's also a good idea to know your test results and keep a list of the medicines you take. How can you care for yourself at home? · Take your antibiotics as directed. Do not stop taking them just because you feel better. You need to take the full course of antibiotics. · Do not eat or drink anything that contains alcohol if you are taking metronidazole (Flagyl). · Keep using your medicine if you start your period. Use pads instead of tampons while using a vaginal cream or suppository. Tampons can absorb the medicine. · Wear loose cotton clothing. Do not wear nylon and other materials that hold body heat and moisture close to the skin. · Do not scratch. Relieve itching with a cold pack or a cool bath. · Do not wash your vaginal area more than once a day. Use plain water or a mild, unscented soap. Do not douche. When should you call for help?   Watch closely for changes in your health, and be sure to contact your doctor if:    · You have unexpected vaginal bleeding.     · You have a fever.     · You have new or increased pain in your vagina or pelvis.     · You are not getting better after 1 week.     · Your symptoms return after you finish the course of your medicine. Where can you learn more? Go to http://shelly-ben.info/. Andrade Cindy in the search box to learn more about \"Bacterial Vaginosis: Care Instructions. \"  Current as of: May 14, 2018  Content Version: 11.9  © 6718-1427 [employer]. Care instructions adapted under license by [employer] (which disclaims liability or warranty for this information). If you have questions about a medical condition or this instruction, always ask your healthcare professional. [address] any warranty or liability for your use of this information. Patient Education        Vaginal Yeast Infection: Care Instructions  Your Care Instructions    A vaginal yeast infection is caused by too many yeast cells in the vagina. This is common in women of all ages. Itching, vaginal discharge and irritation, and other symptoms can bother you. But yeast infections don't often cause other health problems. Some medicines can increase your risk of getting a yeast infection. These include antibiotics, birth control pills, hormones, and steroids. You may also be more likely to get a yeast infection if you are pregnant, have diabetes, douche, or wear tight clothes. With treatment, most yeast infections get better in 2 to 3 days. Follow-up care is a key part of your treatment and safety. Be sure to make and go to all appointments, and call your doctor if you are having problems. It's also a good idea to know your test results and keep a list of the medicines you take. How can you care for yourself at home? · Take your medicines exactly as prescribed. Call your doctor if you think you are having a problem with your medicine.   · Ask your doctor about over-the-counter (OTC) medicines for yeast infections. They may cost less than prescription medicines. If you use an OTC treatment, read and follow all instructions on the label. · Do not use tampons while using a vaginal cream or suppository. The tampons can absorb the medicine. Use pads instead. · Wear loose cotton clothing. Do not wear nylon or other fabric that holds body heat and moisture close to the skin. · Try sleeping without underwear. · Do not scratch. Relieve itching with a cold pack or a cool bath. · Do not wash your vaginal area more than once a day. Use plain water or a mild, unscented soap. Air-dry the vaginal area. · Change out of wet swimsuits after swimming. · Do not have sex until you have finished your treatment. · Do not douche. When should you call for help? Call your doctor now or seek immediate medical care if:    · You have unexpected vaginal bleeding.     · You have new or increased pain in your vagina or pelvis.    Watch closely for changes in your health, and be sure to contact your doctor if:    · You have a fever.     · You are not getting better after 2 days.     · Your symptoms come back after you finish your medicines. Where can you learn more? Go to http://shelly-ben.info/. Enter S703 in the search box to learn more about \"Vaginal Yeast Infection: Care Instructions. \"  Current as of: May 14, 2018  Content Version: 11.9  © 0194-4462 [employer]. Care instructions adapted under license by [employer] (which disclaims liability or warranty for this information). If you have questions about a medical condition or this instruction, always ask your healthcare professional. [place] [address] any warranty or liability for your use of this information.          Patient Education        Threatened Miscarriage: Care Instructions  Your Care Instructions    Some women have light spotting or bleeding during the first 12 weeks of pregnancy. In some cases this is normal. Light spotting or bleeding can also be a sign of a possible loss of the pregnancy. This is called a threatened miscarriage. At this point, the doctor may not be able to tell if your vaginal bleeding is normal or is a sign of a miscarriage. In early pregnancy, things such as stress, exercise, and sex do not cause miscarriage. You may be worried or upset about the possibility of losing your pregnancy. But do not blame yourself. There is no treatment to stop a threatened miscarriage. If you do have a miscarriage, there was nothing you could have done to prevent it. A miscarriage usually means that the pregnancy is not developing normally. The doctor has checked you carefully, but problems can develop later. If you notice any problems or new symptoms, get medical treatment right away. Follow-up care is a key part of your treatment and safety. Be sure to make and go to all appointments, and call your doctor if you are having problems. It's also a good idea to know your test results and keep a list of the medicines you take. How can you care for yourself at home? · If you do have a miscarriage, you will probably have some vaginal bleeding for 1 to 2 weeks. Use pads instead of tampons. · Take acetaminophen (Tylenol) for cramps. Read and follow all instructions on the label. · Do not take two or more pain medicines at the same time unless the doctor told you to. Many pain medicines have acetaminophen, which is Tylenol. Too much acetaminophen (Tylenol) can be harmful. · Do not have sex until your doctor says it is okay. · Get lots of rest over the next several days. · You may do your normal activities if you feel well enough to do them. But do not do any heavy exercise until your doctor says it is okay. · Eat a balanced diet that is high in iron and vitamin C. Foods rich in iron include red meat, shellfish, eggs, beans, and leafy green vegetables.  Foods high in vitamin C include citrus fruits, tomatoes, and broccoli. Talk to your doctor about whether you need to take iron pills or a multivitamin. · Do not drink alcohol or use tobacco or illegal drugs. · Do not smoke. If you need help quitting, talk to your doctor about stop-smoking programs and medicines. These can increase your chances of quitting for good. When should you call for help? Call 911 anytime you think you may need emergency care. For example, call if:    · You passed out (lost consciousness).    Call your doctor now or seek immediate medical care if:    · You have severe vaginal bleeding.     · You are dizzy or lightheaded, or you feel like you may faint.     · You have new or worse pain in your belly or pelvis.     · You have a fever.     · You have vaginal discharge that smells bad.    Watch closely for changes in your health, and be sure to contact your doctor if:    · You do not get better as expected. Where can you learn more? Go to http://shelly-ben.info/. Enter B576 in the search box to learn more about \"Threatened Miscarriage: Care Instructions. \"  Current as of: September 5, 2018  Content Version: 11.9  © 4438-6089 [employer]. Care instructions adapted under license by [employer] (which disclaims liability or warranty for this information). If you have questions about a medical condition or this instruction, always ask your healthcare professional. [address] any warranty or liability for your use of this information.

## 2019-07-14 ENCOUNTER — HOSPITAL ENCOUNTER (EMERGENCY)
Age: 24
Discharge: HOME | End: 2019-07-15
Attending: EMERGENCY MEDICINE
Payer: COMMERCIAL

## 2019-07-14 DIAGNOSIS — O20.0 THREATENED MISCARRIAGE IN EARLY PREGNANCY: ICD-10-CM

## 2019-07-14 DIAGNOSIS — O36.80X0 PREGNANCY, LOCATION UNKNOWN: Primary | ICD-10-CM

## 2019-07-14 DIAGNOSIS — O20.9 VAGINAL BLEEDING AFFECTING EARLY PREGNANCY: ICD-10-CM

## 2019-07-14 LAB
HCG SERPL-ACNC: 1025 MIU/ML (ref 0–10)
HCT VFR BLD AUTO: 41.9 % (ref 35–45)
HGB BLD-MCNC: 14.2 G/DL (ref 12–16)

## 2019-07-14 PROCEDURE — 85018 HEMOGLOBIN: CPT

## 2019-07-14 PROCEDURE — 84702 CHORIONIC GONADOTROPIN TEST: CPT

## 2019-07-14 PROCEDURE — 99283 EMERGENCY DEPT VISIT LOW MDM: CPT

## 2019-07-15 VITALS
HEART RATE: 77 BPM | TEMPERATURE: 98.7 F | HEIGHT: 65 IN | RESPIRATION RATE: 20 BRPM | DIASTOLIC BLOOD PRESSURE: 58 MMHG | BODY MASS INDEX: 21.66 KG/M2 | SYSTOLIC BLOOD PRESSURE: 102 MMHG | OXYGEN SATURATION: 99 % | WEIGHT: 130 LBS

## 2019-07-15 LAB
C TRACH RRNA SPEC QL NAA+PROBE: NEGATIVE
N GONORRHOEA RRNA SPEC QL NAA+PROBE: NEGATIVE
SPECIMEN SOURCE: NORMAL

## 2019-07-15 NOTE — ED NOTES
Verbal shift change report given to Soraida Luis RN (oncoming nurse) by this RN (offgoing nurse). Report included the following information SBAR.

## 2019-07-15 NOTE — ED PROVIDER NOTES
EMERGENCY DEPARTMENT HISTORY AND PHYSICAL EXAM    Date: 7/14/2019  Patient Name: Cristopher Santiago    History of Presenting Illness     Chief Complaint   Patient presents with    Miscarriage         History Provided By: Patient    Additional History (Context):   Cristopher Santiago is a 22 y.o. female with no significant past medical history presents to the emergency department C/O persistent vaginal bleeding, crampy abdominal pain. Patient seen in this emergency department 2 days ago. Had a beta quant of 900. Was told to return for repeat hCG. Last period was May. Patient with no previous pregnancies. Blood type B+. Pt denies nausea, vomiting, fever, diarrhea, and any other sxs or complaints. PCP: Allen Yost MD    Current Outpatient Medications   Medication Sig Dispense Refill    metroNIDAZOLE (FLAGYL) 500 mg tablet Take 1 Tab by mouth two (2) times a day for 7 days. 14 Tab 0    nystatin (MYCOSTATIN) topical cream Apply  to affected area two (2) times a day. 15 g 0    butalbital-acetaminophen-caff (FIORICET) -40 mg per capsule Take 1 Cap by mouth every four (4) hours as needed for Pain. 10 Cap 0    ondansetron (ZOFRAN ODT) 4 mg disintegrating tablet Take 1 Tab by mouth every eight (8) hours as needed for Nausea (or vomiting.). Allow to dissolve on tongue 12 Tab 0    norgestimate-ethinyl estradiol (MONO-LINYAH) 0.25-35 mg-mcg per tablet Take 1 Tab by mouth daily. Past History     Past Medical History:  Past Medical History:   Diagnosis Date    Celiac disease        Past Surgical History:  Past Surgical History:   Procedure Laterality Date    HX WISDOM TEETH EXTRACTION         Family History:  History reviewed. No pertinent family history. Social History:  Social History     Tobacco Use    Smoking status: Never Smoker    Smokeless tobacco: Never Used   Substance Use Topics    Alcohol use: Yes    Drug use: Never       Allergies:   Allergies   Allergen Reactions    Gluten Other (comments)     GI distress         Review of Systems   Review of Systems   Constitutional: Negative for chills and fever. HENT: Negative for congestion, ear pain, sinus pain and sore throat. Eyes: Negative for pain and visual disturbance. Respiratory: Negative for cough and shortness of breath. Cardiovascular: Negative for chest pain and leg swelling. Gastrointestinal: Positive for abdominal pain. Negative for constipation, diarrhea, nausea and vomiting. Genitourinary: Positive for vaginal bleeding. Negative for dysuria, hematuria and vaginal discharge. Musculoskeletal: Negative for back pain and neck pain. Skin: Negative for rash and wound. Neurological: Negative for dizziness, tremors, weakness, light-headedness and numbness. All other systems reviewed and are negative.       Physical Exam     Vitals:    07/14/19 2150   BP: 124/59   Pulse: 77   Resp: 20   Temp: 98.7 °F (37.1 °C)   SpO2: 100%   Weight: 59 kg (130 lb)   Height: 5' 5\" (1.651 m)     Physical Exam    Nursing note and vitals reviewed    Constitutional: Well appearing  female, no acute distress  Head: Normocephalic, Atraumatic  Eyes: Pupils are equal, round, and reactive to light, EOMI  Neck: Supple, non-tender  Cardiovascular: Regular rate and rhythm, no murmurs, rubs, or gallops  Chest: Normal work of breathing and chest excursion bilaterally  Lungs: Clear to ausculation bilaterally, no wheezes, no rhonchi  Abdomen: Soft, non tender, non distended, normoactive bowel sounds  Back: No evidence of trauma or deformity  Extremities: No evidence of trauma or deformity, no LE edema  Skin: Warm and dry, normal cap refill  Neuro: Alert and appropriate, CN intact, normal speech, moving all 4 extremities freely and symmetrically  Psychiatric: Normal mood and affect       Diagnostic Study Results     Labs -     Recent Results (from the past 12 hour(s))   BETA HCG, QT    Collection Time: 07/14/19 10:12 PM   Result Value Ref Range    Beta HCG, QT 1,025 (H) 0 - 10 MIU/ML   HGB & HCT    Collection Time: 07/14/19 10:12 PM   Result Value Ref Range    HGB 14.2 12.0 - 16.0 g/dL    HCT 41.9 35.0 - 45.0 %       Radiologic Studies -   US UTS TRANSVAGINAL OB    (Results Pending)     CT Results  (Last 48 hours)    None        CXR Results  (Last 48 hours)    None            Medical Decision Making   I am the first provider for this patient. I reviewed the vital signs, available nursing notes, past medical history, past surgical history, family history and social history. Vital Signs-Reviewed the patient's vital signs. Pulse Oximetry Analysis -100 % on room air    Records Reviewed: Nursing Notes and Old Medical Records    Provider Notes:   22 y.o. female G1, P0 presenting with vaginal bleeding. Seen 2 days ago with beta hCG of 900. On exam patient is afebrile, normotensive, not appearing acutely ill or in acute distress. Benign abdominal exam.  Will recheck beta-hCG. No indication for repeat pelvic exam as this was done 2 days prior. Procedures:  Procedures    ED Course:   10:10 PM   Initial assessment performed. The patients presenting problems have been discussed, and they are in agreement with the care plan formulated and outlined with them. I have encouraged them to ask questions as they arise throughout their visit. 12:14 AM  Patient's hemoglobin stable at 14.2. Beta-hCG 1025. This is an inappropriate increase in her hCG. Recommend transvaginal ultrasound as the patient with abdominal pain. After discussion with patient and her significant other at bedside, they would like to defer ultrasound at this time. Discussed strict return precautions. Discussed possibility of ectopic pregnancy. Patient verbalized understanding. States that she does have a follow-up appointment with her OB/GYN on Friday however states that she will call tomorrow to schedule a possible ultrasound.     12:15 AM    I informed the pt that she needed further workup for adequate evaluation for her vaginal bleeding in pregnancy, abdominal pain, and that by refusing the above, she is at risk for further deterioration. She is awake, alert, and she understands her condition and the risks involved in leaving. She is clinically aware of her surroundings and able to ask appropriate questions, the patients significant other and the nurse present confirms she is not clinically intoxicated and able to make medical decisions. She verbalized knowing the risks and understood it was recommended that she stay and could also return at any time. her significant other was present for the discussion and also verbalized that they understood both diagnosis, risks and could return at any time. They were both provided with warnings regarding worsening of her condition and were provided instructions to follow up with Kristina Hewitt MD tomorrow or return to the Emergency Room as soon as possible. Diagnosis and Disposition       DISCHARGE NOTE:  12:14 AM    Anabela Posadatomasa's  results have been reviewed with her. She has been counseled regarding her diagnosis, treatment, and plan. She verbally conveys understanding and agreement of the signs, symptoms, diagnosis, treatment and prognosis and additionally agrees to follow up as discussed. She also agrees with the care-plan and conveys that all of her questions have been answered. I have also provided discharge instructions for her that include: educational information regarding their diagnosis and treatment, and list of reasons why they would want to return to the ED prior to their follow-up appointment, should her condition change. She has been provided with education for proper emergency department utilization. CLINICAL IMPRESSION:    1. Pregnancy, location unknown    2. Vaginal bleeding affecting early pregnancy    3. Threatened miscarriage in early pregnancy        PLAN:  1. D/C Home  2.    Current Discharge Medication List        3. Follow-up Information     Follow up With Specialties Details Why Contact Info    Oscar Ramon MD [facility] Schedule an appointment as soon as possible for a visit in 2 days  [address] [facility] [address]      Kenneth Gorman MD Obstetrics & Gynecology Schedule an appointment as soon as possible for a visit in 2 days  [phone]      [facility] EMERGENCY DEPT Emergency Medicine  As needed if symptoms worsen [address] [place] [address]  [phone]        ____________________________________     Please note that this dictation was completed with [employer], the computer voice recognition software. Quite often unanticipated grammatical, syntax, homophones, and other interpretive errors are inadvertently transcribed by the computer software. Please disregard these errors. Please excuse any errors that have escaped final proofreading.

## 2019-07-15 NOTE — ED TRIAGE NOTES
Patient was seen here 2 days ago for vaginal bleeding and possible miscarriage. Patient with increased pain and bleeding.

## 2019-07-15 NOTE — ED NOTES
I have reviewed discharge instructions with the patient.   The patient verbalized understanding.+  Patient armband removed and shredded

## 2019-07-15 NOTE — DISCHARGE INSTRUCTIONS
You were seen and evaluated in the Emergency Department. Please understand that your work up is not all encompassing and you should follow up with your primary care physician for further management and continuity of care. Please return to Emergency Department or seek medical attention immediately if you have acute worsening in your symptoms or develop chest pain, shortness of breath, repeated vomiting, fever, altered level of consciousness, coughing up blood, or start sweating and feel clammy. If you were prescribed any medicine for home, please take as prescribed by your health-care provider. If you were given any follow-up appointments or numbers to call, please do so as instructed. Avoid any tobacco products or excessive alcohol. Patient Education        Threatened Miscarriage: Care Instructions  Your Care Instructions    Some women have light spotting or bleeding during the first 12 weeks of pregnancy. In some cases this is normal. Light spotting or bleeding can also be a sign of a possible loss of the pregnancy. This is called a threatened miscarriage. At this point, the doctor may not be able to tell if your vaginal bleeding is normal or is a sign of a miscarriage. In early pregnancy, things such as stress, exercise, and sex do not cause miscarriage. You may be worried or upset about the possibility of losing your pregnancy. But do not blame yourself. There is no treatment to stop a threatened miscarriage. If you do have a miscarriage, there was nothing you could have done to prevent it. A miscarriage usually means that the pregnancy is not developing normally. The doctor has checked you carefully, but problems can develop later. If you notice any problems or new symptoms, get medical treatment right away. Follow-up care is a key part of your treatment and safety. Be sure to make and go to all appointments, and call your doctor if you are having problems.  It's also a good idea to know your test results and keep a list of the medicines you take. How can you care for yourself at home? · If you do have a miscarriage, you will probably have some vaginal bleeding for 1 to 2 weeks. Use pads instead of tampons. · Take acetaminophen (Tylenol) for cramps. Read and follow all instructions on the label. · Do not take two or more pain medicines at the same time unless the doctor told you to. Many pain medicines have acetaminophen, which is Tylenol. Too much acetaminophen (Tylenol) can be harmful. · Do not have sex until your doctor says it is okay. · Get lots of rest over the next several days. · You may do your normal activities if you feel well enough to do them. But do not do any heavy exercise until your doctor says it is okay. · Eat a balanced diet that is high in iron and vitamin C. Foods rich in iron include red meat, shellfish, eggs, beans, and leafy green vegetables. Foods high in vitamin C include citrus fruits, tomatoes, and broccoli. Talk to your doctor about whether you need to take iron pills or a multivitamin. · Do not drink alcohol or use tobacco or illegal drugs. · Do not smoke. If you need help quitting, talk to your doctor about stop-smoking programs and medicines. These can increase your chances of quitting for good. When should you call for help? Call 911 anytime you think you may need emergency care. For example, call if:    · You passed out (lost consciousness).    Call your doctor now or seek immediate medical care if:    · You have severe vaginal bleeding.     · You are dizzy or lightheaded, or you feel like you may faint.     · You have new or worse pain in your belly or pelvis.     · You have a fever.     · You have vaginal discharge that smells bad.    Watch closely for changes in your health, and be sure to contact your doctor if:    · You do not get better as expected. Where can you learn more? Go to http://shelly-ben.info/.   Enter T650 in the search box to learn more about \"Threatened Miscarriage: Care Instructions. \"  Current as of: September 5, 2018  Content Version: 11.9  © 5539-6617 [employer]. Care instructions adapted under license by [employer] (which disclaims liability or warranty for this information). If you have questions about a medical condition or this instruction, always ask your healthcare professional. [address] any warranty or liability for your use of this information.

## 2020-06-30 ENCOUNTER — HOSPITAL ENCOUNTER (EMERGENCY)
Age: 25
Discharge: HOME | End: 2020-06-30
Attending: EMERGENCY MEDICINE
Payer: COMMERCIAL

## 2020-06-30 VITALS
HEART RATE: 96 BPM | SYSTOLIC BLOOD PRESSURE: 112 MMHG | HEIGHT: 64 IN | TEMPERATURE: 100.3 F | DIASTOLIC BLOOD PRESSURE: 92 MMHG | WEIGHT: 135 LBS | RESPIRATION RATE: 18 BRPM | OXYGEN SATURATION: 100 % | BODY MASS INDEX: 23.05 KG/M2

## 2020-06-30 DIAGNOSIS — S01.331A COMPLICATION OF RIGHT EAR PIERCING, INITIAL ENCOUNTER: ICD-10-CM

## 2020-06-30 DIAGNOSIS — H60.11 CELLULITIS OF EAR, RIGHT: Primary | ICD-10-CM

## 2020-06-30 PROCEDURE — 99284 EMERGENCY DEPT VISIT MOD MDM: CPT

## 2020-06-30 PROCEDURE — 96374 THER/PROPH/DIAG INJ IV PUSH: CPT

## 2020-06-30 PROCEDURE — 74011250637 HC RX REV CODE- 250/637: Performed by: EMERGENCY MEDICINE

## 2020-06-30 PROCEDURE — 74011250636 HC RX REV CODE- 250/636: Performed by: EMERGENCY MEDICINE

## 2020-06-30 RX ORDER — CIPROFLOXACIN 500 MG/1
500 TABLET ORAL
Status: COMPLETED | OUTPATIENT
Start: 2020-06-30 | End: 2020-06-30

## 2020-06-30 RX ORDER — CIPROFLOXACIN 500 MG/1
500 TABLET ORAL 2 TIMES DAILY
Qty: 14 TAB | Refills: 0 | Status: SHIPPED | OUTPATIENT
Start: 2020-07-01 | End: 2020-07-08

## 2020-06-30 RX ORDER — SULFAMETHOXAZOLE AND TRIMETHOPRIM 800; 160 MG/1; MG/1
1 TABLET ORAL
Status: COMPLETED | OUTPATIENT
Start: 2020-06-30 | End: 2020-06-30

## 2020-06-30 RX ORDER — SULFAMETHOXAZOLE AND TRIMETHOPRIM 800; 160 MG/1; MG/1
1 TABLET ORAL 2 TIMES DAILY
Qty: 28 TAB | Refills: 0 | Status: SHIPPED | OUTPATIENT
Start: 2020-07-01 | End: 2020-07-15

## 2020-06-30 RX ORDER — KETOROLAC TROMETHAMINE 30 MG/ML
30 INJECTION, SOLUTION INTRAMUSCULAR; INTRAVENOUS ONCE
Status: COMPLETED | OUTPATIENT
Start: 2020-06-30 | End: 2020-06-30

## 2020-06-30 RX ORDER — KETOROLAC TROMETHAMINE 10 MG/1
10 TABLET, FILM COATED ORAL
Qty: 20 TAB | Refills: 0 | Status: SHIPPED | OUTPATIENT
Start: 2020-06-30 | End: 2020-07-05

## 2020-06-30 RX ADMIN — CIPROFLOXACIN HYDROCHLORIDE 500 MG: 500 TABLET, FILM COATED ORAL at 19:19

## 2020-06-30 RX ADMIN — SULFAMETHOXAZOLE AND TRIMETHOPRIM 1 TABLET: 800; 160 TABLET ORAL at 19:19

## 2020-06-30 RX ADMIN — KETOROLAC TROMETHAMINE 30 MG: 30 INJECTION, SOLUTION INTRAMUSCULAR at 19:19

## 2020-06-30 NOTE — ED TRIAGE NOTES
Pt states \" I have a terrible headache and I have new fresh piercing's in her right ear and I was in the water swinmming when we went out on the boat. I have been running a low grade fever since Saturday night and my neck has never hurt this bad. \"

## 2020-06-30 NOTE — ED PROVIDER NOTES
EMERGENCY DEPARTMENT HISTORY AND PHYSICAL EXAM    Date: 6/30/2020  Patient Name: Aniya Wilson    History of Presenting Illness     Chief Complaint   Patient presents with    Neck Pain         History Provided By: Patient    Aniya Wilson is a 26 y.o. female who presents to the emergency department C/O right ear piercing problem. Patient states she got a fresh piercing in the cartilage on the outside of her right ear. She states she then went swimming out on the boat and since Saturday, 3 days ago has been running a low-grade fever and her ear is been very red and her neck has been very painful. No other complaints. PCP: Samira Black MD    Current Outpatient Medications   Medication Sig Dispense Refill    ketorolac (TORADOL) 10 mg tablet Take 1 Tab by mouth every six (6) hours as needed for Pain for up to 5 days. 20 Tab 0    [START ON 7/1/2020] trimethoprim-sulfamethoxazole (Bactrim DS) 160-800 mg per tablet Take 1 Tab by mouth two (2) times a day for 14 days. 28 Tab 0    [START ON 7/1/2020] ciprofloxacin HCl (Cipro) 500 mg tablet Take 1 Tab by mouth two (2) times a day for 7 days. 14 Tab 0    norgestimate-ethinyl estradiol (MONO-LINYAH) 0.25-35 mg-mcg per tablet Take 1 Tab by mouth daily. Past History     Past Medical History:  Past Medical History:   Diagnosis Date    Celiac disease        Past Surgical History:  Past Surgical History:   Procedure Laterality Date    HX WISDOM TEETH EXTRACTION         Family History:  No family history on file. Social History:  Social History     Tobacco Use    Smoking status: Never Smoker    Smokeless tobacco: Never Used   Substance Use Topics    Alcohol use: Yes    Drug use: Never       Allergies: Allergies   Allergen Reactions    Gluten Other (comments)     GI distress         Review of Systems   Review of Systems   Constitutional: Positive for fatigue and fever. HENT: Positive for ear pain. Negative for ear discharge. Musculoskeletal: Positive for arthralgias, myalgias and neck pain. Negative for neck stiffness. Physical Exam     Vitals:    06/30/20 1856   BP: (!) 112/92   Pulse: 96   Resp: 18   Temp: 100.3 °F (37.9 °C)   SpO2: 100%   Weight: 61.2 kg (135 lb)   Height: 5' 4\" (1.626 m)     Physical Exam    Nursing notes and vital signs reviewed    Airway: intact, speaking normally  Breathing: No apparent distress, no cyanosis  Circulation: Peripheral pulses equal    Constitutional: Non toxic appearing, no acute distress  HEENT & Neck: Normocephalic, Atraumatic, PERRL, EOMI, No rhinorrhea, external nose normal, the right external ear appears significantly erythematous with soft tissue swelling, there is a piercing on the antihelical fold with mild purulent drainage, tender to palpation, mucous membranes moist, No stridor, No JVD, palpable cervical lymphadenopathy in the right neck, negative Brudzinski and Kernig sign  Cardiovascular: Regular rate and rhythm, no murmurs  Chest: Normal work of breathing and chest excursion bilaterally  Lungs: Clear to ausculation bilaterally  Abdomen: Soft, non tender, non distended, normoactive bowel sounds, No rigidity, no peritoneal signs  Musculoskeletal: No evidence of trauma or deformity to the back or neck  Extremities: No evidence of trauma or deformity, no LE edema  Skin: Warm, No obvious rashes  Neuro: Alert and oriented x 3, CN 2-12 intact, normal speech, strength and sensation full and symmetric bilaterally, normal coordination  Psychiatric: Normal mood and affect      Diagnostic Study Results     Labs -   No results found for this or any previous visit (from the past 72 hour(s)).     Radiologic Studies -   No orders to display     CT Results  (Last 48 hours)    None        CXR Results  (Last 48 hours)    None          Medications given in the ED-  Medications   ketorolac (TORADOL) injection 30 mg (30 mg IntraVENous Given 6/30/20 1919)   ciprofloxacin HCl (CIPRO) tablet 500 mg (500 mg Oral Given 6/30/20 1919)   trimethoprim-sulfamethoxazole (BACTRIM DS, SEPTRA DS) 160-800 mg per tablet 1 Tab (1 Tab Oral Given 6/30/20 1919)         Medical Decision Making   I am the first provider for this patient. I reviewed the vital signs, available nursing notes, past medical history, past surgical history, family history and social history. Vital Signs-Reviewed the patient's vital signs. Records Reviewed: Nursing Notes    Procedures:  Procedures    ED Course:   Considering the nature of the patient's infection concern for multiple bacterial type infection to include MRSA as well as Pseudomonas at the very least.  Will cover the patient with Bactrim and Cipro at this time. She was given Toradol for pain. I recommended she remove her piercing as soon as possible and keep the area clean and dry with antibacterial soap and water. Recommended to come back to the emergency department if symptoms do not improve or worsen within 3 or 4 days otherwise follow-up with her primary care physician for reevaluation of her infection. She understands and agrees to plan    Diagnosis and Disposition       DISCHARGE NOTE:    Claire Go Malin's  results have been reviewed with her. She has been counseled regarding her diagnosis, treatment, and plan. She verbally conveys understanding and agreement of the signs, symptoms, diagnosis, treatment and prognosis and additionally agrees to follow up as discussed. She also agrees with the care-plan and conveys that all of her questions have been answered. I have also provided discharge instructions for her that include: educational information regarding their diagnosis and treatment, and list of reasons why they would want to return to the ED prior to their follow-up appointment, should her condition change. She has been provided with education for proper emergency department utilization. CLINICAL IMPRESSION:    1. Cellulitis of ear, right    2. Complication of right ear piercing, initial encounter        PLAN:  1. D/C Home  2. Current Discharge Medication List      START taking these medications    Details   ketorolac (TORADOL) 10 mg tablet Take 1 Tab by mouth every six (6) hours as needed for Pain for up to 5 days. Qty: 20 Tab, Refills: 0      trimethoprim-sulfamethoxazole (Bactrim DS) 160-800 mg per tablet Take 1 Tab by mouth two (2) times a day for 14 days. Qty: 28 Tab, Refills: 0      ciprofloxacin HCl (Cipro) 500 mg tablet Take 1 Tab by mouth two (2) times a day for 7 days. Qty: 14 Tab, Refills: 0           3. Follow-up Information     Follow up With Specialties Details Why Contact Rody Smith MD [facility] Schedule an appointment as soon as possible for a visit  For wound re-check [address] [facility] [address]      [facility] EMERGENCY DEPT Emergency Medicine Go to  If symptoms worsen [address] [place] [address]  [phone]        _______________________________      Please note that this dictation was completed with [employer], the computer voice recognition software. Quite often unanticipated grammatical, syntax, homophones, and other interpretive errors are inadvertently transcribed by the computer software. Please disregard these errors. Please excuse any errors that have escaped final proofreading.

## 2020-06-30 NOTE — DISCHARGE INSTRUCTIONS
Remove your ear piercing  Wash your ear with antibacterial soap twice a day and keep dry after  Take all antibiotics until completion  Come back to ER if not seeing improvement within 3-4 days or if it worsens within that time

## 2022-03-20 PROBLEM — S01.331A COMPLICATION OF RIGHT EAR PIERCING: Status: ACTIVE | Noted: 2020-06-30

## 2022-03-20 PROBLEM — H60.11 CELLULITIS OF EAR, RIGHT: Status: ACTIVE | Noted: 2020-06-30

## 2025-07-18 ENCOUNTER — TELEPHONE (OUTPATIENT)
Facility: HOSPITAL | Age: 30
End: 2025-07-18